# Patient Record
Sex: MALE | Race: WHITE | NOT HISPANIC OR LATINO | URBAN - METROPOLITAN AREA
[De-identification: names, ages, dates, MRNs, and addresses within clinical notes are randomized per-mention and may not be internally consistent; named-entity substitution may affect disease eponyms.]

---

## 2022-09-01 ENCOUNTER — HOSPITAL ENCOUNTER (EMERGENCY)
Facility: MEDICAL CENTER | Age: 27
End: 2022-09-01
Attending: EMERGENCY MEDICINE
Payer: COMMERCIAL

## 2022-09-01 VITALS
BODY MASS INDEX: 22.41 KG/M2 | WEIGHT: 156.53 LBS | OXYGEN SATURATION: 99 % | HEIGHT: 70 IN | SYSTOLIC BLOOD PRESSURE: 121 MMHG | TEMPERATURE: 97.2 F | DIASTOLIC BLOOD PRESSURE: 73 MMHG | HEART RATE: 65 BPM | RESPIRATION RATE: 18 BRPM

## 2022-09-01 DIAGNOSIS — M25.511 ACUTE PAIN OF RIGHT SHOULDER: ICD-10-CM

## 2022-09-01 PROCEDURE — 99283 EMERGENCY DEPT VISIT LOW MDM: CPT

## 2022-09-01 ASSESSMENT — PAIN SCALES - WONG BAKER: WONGBAKER_NUMERICALRESPONSE: HURTS EVEN MORE

## 2022-09-01 NOTE — DISCHARGE INSTRUCTIONS
Please see Dr. Llanos who will likely order a contrast MRI of the right shoulder to delineate the extent of this acute on chronic injury    Take Tylenol and or ibuprofen for pain control and sling as needed for comfort

## 2022-09-01 NOTE — ED PROVIDER NOTES
"ED Provider Note    CHIEF COMPLAINT  Chief Complaint   Patient presents with    Shoulder Pain       HPI  Guillermo Dupree is a 26 y.o. male who presents with a report of acute on chronic shoulder pain for the last 2 days.  He states that he was doing some LAT pull downs and noticed some acute pain develop in his right shoulder just slightly posterior to the trapezius insertion.  He states that he has a hard time with any movement above the shoulder of his right arm and generally range of motion is limited based on pain.  Has had problems with the shoulder since high school where he did a lot of heavy lifting at a young age and this is not the first time he has injured his shoulder which is only partially responded to physical therapy in the past.  He states that he is ex  and still service-connected at 100% disabled.    ROS  Pertinent negative for any other interim trauma.    PAST MEDICAL HISTORY  History reviewed. No pertinent past medical history.    FAMILY HISTORY  No family history on file.    SOCIAL HISTORY   reports that he has never smoked. He has never used smokeless tobacco. He reports that he does not drink alcohol and does not use drugs.    SURGICAL HISTORY  History reviewed. No pertinent surgical history.    CURRENT MEDICATIONS  Home Medications    **Home medications have not yet been reviewed for this encounter**         ALLERGIES  No Known Allergies    PHYSICAL EXAM  VITAL SIGNS: /75   Pulse 63   Temp 36 °C (96.8 °F) (Temporal)   Resp 17   Ht 1.778 m (5' 10\")   Wt 71 kg (156 lb 8.4 oz)   SpO2 98%   BMI 22.46 kg/m²    Constitutional: Well developed, Well nourished, No acute distress, Non-toxic appearance.   Skin: Normal  Back: Nontender  Extremities: Right upper extremity has significant reduction of range of motion with significant pain when motioning above the shoulder joint.  There is tenderness at the upper posterior right shoulder at the insertion of the trapezius.  No " crepitus  Musculoskeletal:   Neurologic: Intact  Psychiatric:       COURSE & MEDICAL DECISION MAKING  Pertinent Labs & Imaging studies reviewed. (See chart for details)  Patient has significant reduction in range of motion after this event in the weight room where he was doing LAT pull downs and felt something give out.    I am placing him in a sling.  We will forego x-rays as he said that he is always had negative x-rays in the past and I have a low suspicion for fracture.  Instead I think he would benefit from outpatient contrast MRI through the orthopedist office and I have referred him to Dr. Llanos    He understands his plan of care will take OTC medications for pain control and will return if any significant change in symptoms develop    FINAL IMPRESSION  1. Acute pain of right shoulder            Electronically signed by: Flaco Goodwin M.D., 9/1/2022 12:54 PM    The note accurately reflects work and decisions made by me.  Flaco Goodwin M.D.  9/1/2022  12:58 PM

## 2022-09-01 NOTE — ED NOTES
Sling applied   D/c pt home, no rx given . Pt aware of f/u instructions with LATANYA , aware to return for any changes or concerns. No further questions upon d/c home from ed

## 2022-09-01 NOTE — ED TRIAGE NOTES
Pt amb to triage c/o R shoulder pain x several weeks with incr severity x2d. Pt states pain worsened after working out with weights x2d ago

## 2024-05-02 ENCOUNTER — HOSPITAL ENCOUNTER (OUTPATIENT)
Dept: RADIOLOGY | Facility: MEDICAL CENTER | Age: 29
End: 2024-05-02
Attending: STUDENT IN AN ORGANIZED HEALTH CARE EDUCATION/TRAINING PROGRAM
Payer: COMMERCIAL

## 2024-05-02 DIAGNOSIS — M25.561 RIGHT KNEE PAIN, UNSPECIFIED CHRONICITY: ICD-10-CM

## 2024-05-02 DIAGNOSIS — M25.562 LEFT KNEE PAIN, UNSPECIFIED CHRONICITY: ICD-10-CM
